# Patient Record
Sex: FEMALE
[De-identification: names, ages, dates, MRNs, and addresses within clinical notes are randomized per-mention and may not be internally consistent; named-entity substitution may affect disease eponyms.]

---

## 2023-10-27 ENCOUNTER — APPOINTMENT (OUTPATIENT)
Dept: ORTHOPEDIC SURGERY | Facility: CLINIC | Age: 63
End: 2023-10-27
Payer: MEDICARE

## 2023-10-27 VITALS — BODY MASS INDEX: 26.66 KG/M2 | WEIGHT: 160 LBS | HEIGHT: 65 IN

## 2023-10-27 DIAGNOSIS — Z87.19 PERSONAL HISTORY OF OTHER DISEASES OF THE DIGESTIVE SYSTEM: ICD-10-CM

## 2023-10-27 DIAGNOSIS — I10 ESSENTIAL (PRIMARY) HYPERTENSION: ICD-10-CM

## 2023-10-27 DIAGNOSIS — I63.9 CEREBRAL INFARCTION, UNSPECIFIED: ICD-10-CM

## 2023-10-27 PROCEDURE — 73010 X-RAY EXAM OF SHOULDER BLADE: CPT | Mod: LT

## 2023-10-27 PROCEDURE — 73030 X-RAY EXAM OF SHOULDER: CPT | Mod: LT

## 2023-10-27 PROCEDURE — 99214 OFFICE O/P EST MOD 30 MIN: CPT

## 2023-10-27 RX ORDER — FAMOTIDINE 10 MG/1
TABLET, FILM COATED ORAL
Refills: 0 | Status: ACTIVE | COMMUNITY

## 2023-10-27 RX ORDER — WARFARIN 4 MG/1
TABLET ORAL
Refills: 0 | Status: ACTIVE | COMMUNITY

## 2023-10-27 RX ORDER — ESOMEPRAZOLE MAGNESIUM 2.5 MG/1
2.5 GRANULE, DELAYED RELEASE ORAL
Refills: 0 | Status: ACTIVE | COMMUNITY

## 2023-10-27 RX ORDER — METHYLPREDNISOLONE 4 MG/1
4 TABLET ORAL
Qty: 1 | Refills: 0 | Status: ACTIVE | COMMUNITY
Start: 2023-10-27 | End: 1900-01-01

## 2023-10-27 RX ORDER — ESCITALOPRAM OXALATE 5 MG/1
TABLET, FILM COATED ORAL
Refills: 0 | Status: ACTIVE | COMMUNITY

## 2023-12-08 ENCOUNTER — APPOINTMENT (OUTPATIENT)
Dept: ORTHOPEDIC SURGERY | Facility: CLINIC | Age: 63
End: 2023-12-08

## 2024-02-23 ENCOUNTER — APPOINTMENT (OUTPATIENT)
Dept: ORTHOPEDIC SURGERY | Facility: CLINIC | Age: 64
End: 2024-02-23
Payer: MEDICARE

## 2024-02-23 VITALS — HEIGHT: 65 IN | WEIGHT: 160 LBS | BODY MASS INDEX: 26.66 KG/M2

## 2024-02-23 DIAGNOSIS — M75.02 ADHESIVE CAPSULITIS OF LEFT SHOULDER: ICD-10-CM

## 2024-02-23 DIAGNOSIS — M25.512 PAIN IN LEFT SHOULDER: ICD-10-CM

## 2024-02-23 PROCEDURE — 99214 OFFICE O/P EST MOD 30 MIN: CPT

## 2024-02-23 NOTE — REASON FOR VISIT
[FreeTextEntry2] : This is a 63 year old RHD F with left shoulder pain since June 2023.  It doesn't wake her at night.  Reaching BTB is uncomfortable.  OH reaching with weight is also sore.  Lying on her left side is sore.  Reaching across her body is uncomfortable.  No n/t.  She hasn't taken any meds for this.  She has tried Ibuprofen, though she is on Warfarin.  She reports her INRs are OK. She completed MDP and has been going to PT with 80% relief.

## 2024-02-23 NOTE — ASSESSMENT
[FreeTextEntry1] : We discussed her course. She is making gains. Cont PT planned. with a HEP. An injection is an option if sx persist. Questions answered.  Patient seen by Mike Vásquez M.D.

## 2024-02-23 NOTE — PHYSICAL EXAM
[Left] : left shoulder [Sitting] : sitting [5 ___] : forward flexion 5[unfilled]/5 [Right] : right shoulder [Mild] : mild [Minimal] : minimal [FreeTextEntry8] : These are less. [] : no sensory deficits [FreeTextEntry9] : IR to T12. [TWNoteComboBox6] : internal rotation L2 [de-identified] : external rotation 80 degrees [TWNoteComboBox4] : passive forward flexion 150 degrees

## 2024-02-23 NOTE — HISTORY OF PRESENT ILLNESS
[7] : 7 [0] : 0 [de-identified] : Here for left shoulder follow up. Finished MDP. Going to PT 2x weekly. Feeling 80% better.  [FreeTextEntry1] : left shoulder [de-identified] : activity [de-identified] : MDP, PT